# Patient Record
Sex: FEMALE | Race: WHITE | URBAN - METROPOLITAN AREA
[De-identification: names, ages, dates, MRNs, and addresses within clinical notes are randomized per-mention and may not be internally consistent; named-entity substitution may affect disease eponyms.]

---

## 2017-06-19 ENCOUNTER — OUTPATIENT (OUTPATIENT)
Dept: OUTPATIENT SERVICES | Facility: HOSPITAL | Age: 67
LOS: 1 days | Discharge: HOME | End: 2017-06-19

## 2017-06-28 DIAGNOSIS — E11.9 TYPE 2 DIABETES MELLITUS WITHOUT COMPLICATIONS: ICD-10-CM

## 2017-06-28 DIAGNOSIS — I10 ESSENTIAL (PRIMARY) HYPERTENSION: ICD-10-CM

## 2017-06-28 DIAGNOSIS — E78.00 PURE HYPERCHOLESTEROLEMIA, UNSPECIFIED: ICD-10-CM

## 2017-10-23 ENCOUNTER — OUTPATIENT (OUTPATIENT)
Dept: OUTPATIENT SERVICES | Facility: HOSPITAL | Age: 67
LOS: 1 days | Discharge: HOME | End: 2017-10-23

## 2017-10-23 DIAGNOSIS — E11.9 TYPE 2 DIABETES MELLITUS WITHOUT COMPLICATIONS: ICD-10-CM

## 2017-10-23 DIAGNOSIS — E78.00 PURE HYPERCHOLESTEROLEMIA, UNSPECIFIED: ICD-10-CM

## 2017-10-23 DIAGNOSIS — Z00.00 ENCOUNTER FOR GENERAL ADULT MEDICAL EXAMINATION WITHOUT ABNORMAL FINDINGS: ICD-10-CM

## 2017-10-23 DIAGNOSIS — I10 ESSENTIAL (PRIMARY) HYPERTENSION: ICD-10-CM

## 2018-03-27 ENCOUNTER — OUTPATIENT (OUTPATIENT)
Dept: OUTPATIENT SERVICES | Facility: HOSPITAL | Age: 68
LOS: 1 days | Discharge: HOME | End: 2018-03-27

## 2018-03-27 DIAGNOSIS — E11.9 TYPE 2 DIABETES MELLITUS WITHOUT COMPLICATIONS: ICD-10-CM

## 2018-03-27 DIAGNOSIS — E78.00 PURE HYPERCHOLESTEROLEMIA, UNSPECIFIED: ICD-10-CM

## 2018-03-27 DIAGNOSIS — I10 ESSENTIAL (PRIMARY) HYPERTENSION: ICD-10-CM

## 2018-03-27 DIAGNOSIS — Z00.00 ENCOUNTER FOR GENERAL ADULT MEDICAL EXAMINATION WITHOUT ABNORMAL FINDINGS: ICD-10-CM

## 2018-08-08 ENCOUNTER — OUTPATIENT (OUTPATIENT)
Dept: OUTPATIENT SERVICES | Facility: HOSPITAL | Age: 68
LOS: 1 days | Discharge: HOME | End: 2018-08-08

## 2018-08-08 DIAGNOSIS — Z00.00 ENCOUNTER FOR GENERAL ADULT MEDICAL EXAMINATION WITHOUT ABNORMAL FINDINGS: ICD-10-CM

## 2018-08-08 DIAGNOSIS — D51.0 VITAMIN B12 DEFICIENCY ANEMIA DUE TO INTRINSIC FACTOR DEFICIENCY: ICD-10-CM

## 2018-08-08 DIAGNOSIS — E78.00 PURE HYPERCHOLESTEROLEMIA, UNSPECIFIED: ICD-10-CM

## 2018-08-08 DIAGNOSIS — E11.9 TYPE 2 DIABETES MELLITUS WITHOUT COMPLICATIONS: ICD-10-CM

## 2018-08-10 ENCOUNTER — OUTPATIENT (OUTPATIENT)
Dept: OUTPATIENT SERVICES | Facility: HOSPITAL | Age: 68
LOS: 1 days | Discharge: HOME | End: 2018-08-10

## 2018-08-10 DIAGNOSIS — Z12.31 ENCOUNTER FOR SCREENING MAMMOGRAM FOR MALIGNANT NEOPLASM OF BREAST: ICD-10-CM

## 2018-08-21 ENCOUNTER — OUTPATIENT (OUTPATIENT)
Dept: OUTPATIENT SERVICES | Facility: HOSPITAL | Age: 68
LOS: 1 days | Discharge: HOME | End: 2018-08-21

## 2018-08-21 DIAGNOSIS — R92.8 OTHER ABNORMAL AND INCONCLUSIVE FINDINGS ON DIAGNOSTIC IMAGING OF BREAST: ICD-10-CM

## 2018-11-25 ENCOUNTER — TRANSCRIPTION ENCOUNTER (OUTPATIENT)
Age: 68
End: 2018-11-25

## 2018-12-08 ENCOUNTER — OUTPATIENT (OUTPATIENT)
Dept: OUTPATIENT SERVICES | Facility: HOSPITAL | Age: 68
LOS: 1 days | Discharge: HOME | End: 2018-12-08

## 2018-12-08 DIAGNOSIS — E78.00 PURE HYPERCHOLESTEROLEMIA, UNSPECIFIED: ICD-10-CM

## 2018-12-08 DIAGNOSIS — D51.0 VITAMIN B12 DEFICIENCY ANEMIA DUE TO INTRINSIC FACTOR DEFICIENCY: ICD-10-CM

## 2018-12-08 DIAGNOSIS — E11.9 TYPE 2 DIABETES MELLITUS WITHOUT COMPLICATIONS: ICD-10-CM

## 2018-12-08 DIAGNOSIS — Z00.00 ENCOUNTER FOR GENERAL ADULT MEDICAL EXAMINATION WITHOUT ABNORMAL FINDINGS: ICD-10-CM

## 2019-03-01 ENCOUNTER — EMERGENCY (EMERGENCY)
Facility: HOSPITAL | Age: 69
LOS: 0 days | Discharge: HOME | End: 2019-03-02
Attending: EMERGENCY MEDICINE | Admitting: EMERGENCY MEDICINE
Payer: MEDICARE

## 2019-03-01 VITALS
HEART RATE: 64 BPM | SYSTOLIC BLOOD PRESSURE: 187 MMHG | DIASTOLIC BLOOD PRESSURE: 84 MMHG | RESPIRATION RATE: 18 BRPM | TEMPERATURE: 98 F | OXYGEN SATURATION: 99 %

## 2019-03-01 DIAGNOSIS — E11.9 TYPE 2 DIABETES MELLITUS WITHOUT COMPLICATIONS: ICD-10-CM

## 2019-03-01 DIAGNOSIS — E11.649 TYPE 2 DIABETES MELLITUS WITH HYPOGLYCEMIA WITHOUT COMA: ICD-10-CM

## 2019-03-01 DIAGNOSIS — E78.5 HYPERLIPIDEMIA, UNSPECIFIED: ICD-10-CM

## 2019-03-01 DIAGNOSIS — I10 ESSENTIAL (PRIMARY) HYPERTENSION: ICD-10-CM

## 2019-03-01 LAB
ALBUMIN SERPL ELPH-MCNC: 3.9 G/DL — SIGNIFICANT CHANGE UP (ref 3.5–5.2)
ALP SERPL-CCNC: 87 U/L — SIGNIFICANT CHANGE UP (ref 30–115)
ALT FLD-CCNC: 18 U/L — SIGNIFICANT CHANGE UP (ref 0–41)
ANION GAP SERPL CALC-SCNC: 12 MMOL/L — SIGNIFICANT CHANGE UP (ref 7–14)
APPEARANCE UR: CLEAR — SIGNIFICANT CHANGE UP
AST SERPL-CCNC: 18 U/L — SIGNIFICANT CHANGE UP (ref 0–41)
BASOPHILS # BLD AUTO: 0.03 K/UL — SIGNIFICANT CHANGE UP (ref 0–0.2)
BASOPHILS NFR BLD AUTO: 0.5 % — SIGNIFICANT CHANGE UP (ref 0–1)
BILIRUB SERPL-MCNC: 0.4 MG/DL — SIGNIFICANT CHANGE UP (ref 0.2–1.2)
BILIRUB UR-MCNC: NEGATIVE — SIGNIFICANT CHANGE UP
BUN SERPL-MCNC: 9 MG/DL — LOW (ref 10–20)
CALCIUM SERPL-MCNC: 8.8 MG/DL — SIGNIFICANT CHANGE UP (ref 8.5–10.1)
CHLORIDE SERPL-SCNC: 106 MMOL/L — SIGNIFICANT CHANGE UP (ref 98–110)
CO2 SERPL-SCNC: 23 MMOL/L — SIGNIFICANT CHANGE UP (ref 17–32)
COLOR SPEC: YELLOW — SIGNIFICANT CHANGE UP
CREAT SERPL-MCNC: 0.6 MG/DL — LOW (ref 0.7–1.5)
DIFF PNL FLD: NEGATIVE — SIGNIFICANT CHANGE UP
EOSINOPHIL # BLD AUTO: 0.02 K/UL — SIGNIFICANT CHANGE UP (ref 0–0.7)
EOSINOPHIL NFR BLD AUTO: 0.3 % — SIGNIFICANT CHANGE UP (ref 0–8)
GLUCOSE SERPL-MCNC: 201 MG/DL — HIGH (ref 70–99)
GLUCOSE UR QL: 100 MG/DL
HCT VFR BLD CALC: 35.1 % — LOW (ref 37–47)
HGB BLD-MCNC: 11.6 G/DL — LOW (ref 12–16)
IMM GRANULOCYTES NFR BLD AUTO: 0.3 % — SIGNIFICANT CHANGE UP (ref 0.1–0.3)
KETONES UR-MCNC: NEGATIVE — SIGNIFICANT CHANGE UP
LACTATE SERPL-SCNC: 1.4 MMOL/L — SIGNIFICANT CHANGE UP (ref 0.5–2.2)
LEUKOCYTE ESTERASE UR-ACNC: NEGATIVE — SIGNIFICANT CHANGE UP
LYMPHOCYTES # BLD AUTO: 0.58 K/UL — LOW (ref 1.2–3.4)
LYMPHOCYTES # BLD AUTO: 8.8 % — LOW (ref 20.5–51.1)
MCHC RBC-ENTMCNC: 28.2 PG — SIGNIFICANT CHANGE UP (ref 27–31)
MCHC RBC-ENTMCNC: 33 G/DL — SIGNIFICANT CHANGE UP (ref 32–37)
MCV RBC AUTO: 85.2 FL — SIGNIFICANT CHANGE UP (ref 81–99)
MONOCYTES # BLD AUTO: 0.41 K/UL — SIGNIFICANT CHANGE UP (ref 0.1–0.6)
MONOCYTES NFR BLD AUTO: 6.3 % — SIGNIFICANT CHANGE UP (ref 1.7–9.3)
NEUTROPHILS # BLD AUTO: 5.5 K/UL — SIGNIFICANT CHANGE UP (ref 1.4–6.5)
NEUTROPHILS NFR BLD AUTO: 83.8 % — HIGH (ref 42.2–75.2)
NITRITE UR-MCNC: NEGATIVE — SIGNIFICANT CHANGE UP
NRBC # BLD: 0 /100 WBCS — SIGNIFICANT CHANGE UP (ref 0–0)
PH UR: 6 — SIGNIFICANT CHANGE UP (ref 5–8)
PLATELET # BLD AUTO: 290 K/UL — SIGNIFICANT CHANGE UP (ref 130–400)
POTASSIUM SERPL-MCNC: 4.3 MMOL/L — SIGNIFICANT CHANGE UP (ref 3.5–5)
POTASSIUM SERPL-SCNC: 4.3 MMOL/L — SIGNIFICANT CHANGE UP (ref 3.5–5)
PROT SERPL-MCNC: 6.4 G/DL — SIGNIFICANT CHANGE UP (ref 6–8)
PROT UR-MCNC: NEGATIVE MG/DL — SIGNIFICANT CHANGE UP
RBC # BLD: 4.12 M/UL — LOW (ref 4.2–5.4)
RBC # FLD: 13.8 % — SIGNIFICANT CHANGE UP (ref 11.5–14.5)
SODIUM SERPL-SCNC: 141 MMOL/L — SIGNIFICANT CHANGE UP (ref 135–146)
SP GR SPEC: 1.01 — SIGNIFICANT CHANGE UP (ref 1.01–1.03)
UROBILINOGEN FLD QL: 1 MG/DL (ref 0.2–0.2)
WBC # BLD: 6.56 K/UL — SIGNIFICANT CHANGE UP (ref 4.8–10.8)
WBC # FLD AUTO: 6.56 K/UL — SIGNIFICANT CHANGE UP (ref 4.8–10.8)

## 2019-03-01 RX ORDER — METFORMIN HYDROCHLORIDE 850 MG/1
1000 TABLET ORAL
Qty: 0 | Refills: 0 | Status: DISCONTINUED | OUTPATIENT
Start: 2019-03-01 | End: 2019-03-02

## 2019-03-01 RX ADMIN — METFORMIN HYDROCHLORIDE 1000 MILLIGRAM(S): 850 TABLET ORAL at 20:22

## 2019-03-01 NOTE — ED PROVIDER NOTE - CLINICAL SUMMARY MEDICAL DECISION MAKING FREE TEXT BOX
68 female here with hypoglycemia on sulfonylurea Rx, no recent change, no SI or intent to harm, with otherwise normal ROS and exam. Case d/w Tox consult with plan for extended observation and hourly [facility] checking. Will dispo to hugo SNYDER d/w Dr. Castillo

## 2019-03-01 NOTE — ED CDU PROVIDER INITIAL DAY NOTE - OBJECTIVE STATEMENT
69 y/o female with PMHX of DM, HTN, HLD presenting under Toxicology Protocol. Pt presenting to the ED with hypoglycemia. As per pt, this morning was found by her  unresponsive, EMS was contact. Pt found with a fingerstick of 35, pt was given an amp of dextrose with response. Pt currently on Metformin 1000mg BID and Glimepiride (pt unsure of dosage, but takes once a day). Pt has had no change in her medication routine, last dose of Glimepiride was yesterday. Denies any current symptoms. FS in ED triage 149.

## 2019-03-01 NOTE — ED ADULT TRIAGE NOTE - CHIEF COMPLAINT QUOTE
pt hypoglycemic with EMS, EMS gave 1 amp of Dextrose IV and blood glucose increased. pt is alert and oriented x 3, in no distress. pt hypoglycemic with EMS, EMS gave 1 amp of Dextrose IV and blood glucose increased. pt is alert and oriented x 3, in no distress. HE=255 in triage, pt hypoglycemic (FS=35) with EMS, EMS gave 1 amp of Dextrose IV and blood glucose increased. pt is alert and oriented x 3, in no distress. RW=456 in triage,

## 2019-03-01 NOTE — ED CDU PROVIDER INITIAL DAY NOTE - ATTENDING CONTRIBUTION TO CARE
Pt presented with hypoglycemia that occurred in the am. Hx of diabetes and on same meds for a few years. Rarely has hypoglycemia. Pt thinks she ate less the night before. No other complaints. Exam is normal.

## 2019-03-01 NOTE — ED PROVIDER NOTE - PROGRESS NOTE DETAILS
Spoke to toxicology fellow, advised to observe patient for 8-12 hours, perform q1hour fingersticks initially then q2hrs fingerstick after normalization, and give patient complex carbohydrates.

## 2019-03-01 NOTE — ED PROVIDER NOTE - PHYSICAL EXAMINATION
CONSTITUTIONAL: Well-developed; well-nourished; elderly female, well appearing, in no acute distress.   SKIN: warm, dry  HEAD: Normocephalic; atraumatic.  EYES: no conj injection  ENT: No nasal discharge; airway clear.  NECK: Supple; non tender.  CARD: S1, S2 normal; no murmurs, gallops, or rubs. Regular rate and rhythm.   RESP: No wheezes, rales or rhonchi.  ABD: soft ntnd  EXT: Normal ROM.  No clubbing, cyanosis or edema.   NEURO: Alert, oriented, grossly unremarkable  PSYCH: Cooperative, appropriate.

## 2019-03-01 NOTE — CONSULT NOTE ADULT - ASSESSMENT
1- Sulfonylurea induced hypoglycemia    Patient with likely routine hypoglycemia from sulfonylurea.  No precipitating event    Normal renal and liver function.    No CYP inhibiting medications.    No medication interactions.    Plan- acute hypoglycemia-      Given 0.5-1 gram/kg IV dextrose to correct capillary hypoglycemia; feed the patient to replace glycogen stores.    If repeat episode, dop above and give octreotide 50 mcg SQ q 6 hours x 4 doses.    Discontinue glimepiride indefinitely.  Change metformin to 100mg PO BID going forward.    Patient has an appointment with dr. Lambert on Monday.      Observe until tomorrow morning to ensure capillary glucoses remain stable.

## 2019-03-01 NOTE — ED ADULT NURSE NOTE - NSIMPLEMENTINTERV_GEN_ALL_ED
Implemented All Universal Safety Interventions:  [place] to call system. Call bell, personal items and telephone within reach. Instruct patient to call for assistance. Room bathroom lighting operational. Non-slip footwear when patient is off stretcher. Physically safe environment: no spills, clutter or unnecessary equipment. Stretcher in lowest position, wheels locked, appropriate side rails in place.

## 2019-03-01 NOTE — ED PROVIDER NOTE - OBJECTIVE STATEMENT
69 y/o female with pmhx of DM, HTN, DLD presents with hypoglycemia. Patient states she woke up this morning per her routine and then the next thing she remembers is her  calling her name and then EMS arriving to her home. Per , patient was found down in the bedroom unresponsive. Fingerstick on arrival was 35, patient was given 1 amp of dextrose and she ate a muffin afterwards. Patient states she has been taking her medications as usual, no dose change. Patient admits to taking metformin and glimiperide for DM, last dose of glimiperide was yesterday afternoon, per her normal routine. Patient admits to recent productive cough however denies fevers/chills, sob, chest pain, n/v/d, abdominal pain.

## 2019-03-01 NOTE — CONSULT NOTE ADULT - SUBJECTIVE AND OBJECTIVE BOX
Time:19 @ 14:32  [facility] ED  Emergent/Routine    Chief Complaint:  Hypoglycemia    History of Present Illness:  68y woman presents to ED after being found poorly responsive and confused by her  around 8 AM.  Patient states that she woke up normal around 4 AM to feed her dogs.  She took her glimepiride around 4 PM the evening before.  She is also taking metformin 1000 mg orally once per day and also lisinopril atorvastatin, baby ASA and atenolol.  Patient had capillary glucose of 35 and improved after 25 grams of dextrose with complete resolution.  Has eaten a muffin and lunch since this occurred and is is now feeling well.  Has had a mild URI in the past week for which she has been taking guaifenesin.  No fever, chills, abdominal pain, vomiting, diarrhea or rash.  Currently asymptomatic.  No recent changes in dosing.  Denies alcohol or other drug use.        Past Medical History:  ^HYPOGLYCEMIA  MEWS Score  High cholesterol  HTN (hypertension)  DM (diabetes mellitus)  Hypoglycemia  HYPOGLYCEMIA        Home Medications:  See above glimepiride 4mg once per day; metformin 1000 mg orally once per day ;lisinopril atorvastatin, baby ASA and atenolol      Active Medications:  MEDICATIONS  (STANDING):    MEDICATIONS  (PRN):        Social History:  Tobacco:   Denied  EtOH:   Denied  Illicit Substances:   Denied    Family History:  N/A    Review of Systems:  GENERAL: Denies fever/chills, loss of appetite/weight or fatigue  SKIN: [place] rashes, abrasions, lacerations, ecchymosis, erythema, or edema.  HEAD: Denies headache, dizziness or trauma  EYES: Denies blurry vision, diplopia, or photophobia  ENT: Denies earaches, discharge or hearing loss. Denies epistaxis. (+) clear nasal discharge; No sore throat.   CARDIAC: Denies chest pain, palpitations, or SOB.   RESPIRATORY: Denies SOB, cough, hemoptysis or wheezing.   GI: Denies abdominal pain, n/v/d, bloody stools or melena.   : Denies hematuria, dysuria or frequency.   MSK: Denies myalgias, bony deformity or pain.   NEURO: Denies numbness, tingling, weakness.    Physical Exam:  Vital Signs Last 24 Hrs  T(C): 36.4 (01 Mar 2019 08:55), Max: 36.4 (01 Mar 2019 08:55)  T(F): 97.5 (01 Mar 2019 08:55), Max: 97.5 (01 Mar 2019 08:55)  HR: 64 (01 Mar 2019 13:11) (64 - 64)  BP: 180/84 (01 Mar 2019 13:11) (180/84 - 187/84)  BP(mean): --  RR: 18 (01 Mar 2019 13:11) (18 - 18)  SpO2: 99% (01 Mar 2019 13:11) (99% - 99%)  CAPILLARY BLOOD GLUCOSE      POCT Blood Glucose.: 237 mg/dL (01 Mar 2019 13:49)  POCT Blood Glucose.: 238 mg/dL (01 Mar 2019 11:43)  POCT Blood Glucose.: 205 mg/dL (01 Mar 2019 10:40)      VITAL SIGNS: I have reviewed nursing notes and confirm.  CONSTITUTIONAL: Well-developed; well-nourished; in no acute distress.  SKIN: Skin exam is warm and dry, no acute rash.  HEAD: Normocephalic; atraumatic.  EYES: PERRL, EOM intact; conjunctiva and sclera clear.  ENT: No nasal discharge; airway clear. TMs clear.  NECK: Supple; non tender.  CARD: S1, S2 normal; no murmurs, gallops, or rubs. Regular rate and rhythm.  RESP: No wheezes, rales or rhonchi.  ABD: Normal bowel sounds; soft; non-distended; non-tender; no hepatosplenomegaly.  EXT: Normal ROM. No clubbing, cyanosis or edema.  LYMPH: No acute cervical adenopathy.  NEURO: Alert, oriented. Grossly unremarkable. No focal deficits.  PSYCH: Cooperative, appropriate.      GCS:  E:   4/4  V:   5/5  M:   6/6    Labs:                        11.6   6.56  )-----------( 290      ( 01 Mar 2019 10:50 )             35.1     03-    141  |  106  |  9<L>  ----------------------------<  201<H>  4.3   |  23  |  0.6<L>    Ca    8.8      01 Mar 2019 10:50    TPro  6.4  /  Alb  3.9  /  TBili  0.4  /  DBili  x   /  AST  18  /  ALT  18  /  AlkPhos  87  03-      Urinalysis Basic - ( 01 Mar 2019 12:40 )    Color: Yellow / Appearance: Clear / S.010 / pH: x  Gluc: x / Ketone: Negative  / Bili: Negative / Urobili: 1.0 mg/dL   Blood: x / Protein: Negative mg/dL / Nitrite: Negative   Leuk Esterase: Negative / RBC: x / WBC x   Sq Epi: x / Non Sq Epi: x / Bacteria: x        Aspirin:  not detected  Acetaminophen:  not detected  Ethanol:  not detected

## 2019-03-01 NOTE — ED ADULT NURSE REASSESSMENT NOTE - CONDITION
Patient is alert and oriented. Ambulatory in no acute distress. Fingerstick done and medication administered as ordered. Patient denies any chest pain or lightheadedness. Will continue to monitor blood sugars. Po diet tolerated.

## 2019-03-01 NOTE — ED ADULT NURSE NOTE - CHIEF COMPLAINT QUOTE
pt hypoglycemic (FS=35) with EMS, EMS gave 1 amp of Dextrose IV and blood glucose increased. pt is alert and oriented x 3, in no distress. GP=119 in triage,

## 2019-03-01 NOTE — ED PROVIDER NOTE - ATTENDING CONTRIBUTION TO CARE
I personally evaluated the patient. I reviewed the Resident’s or Physician Assistant’s note (as assigned above), and agree with the findings and plan except as documented in my note.     68 female here with unintentional hypoglycemia, has no recent change in Rx or diet. Symptomatic but no syncope or other symptoms.     ROS otherwise unremarkable    PE: female in no distress. CV: pulses intact. SKIN normal HEENT: normal mucosa. NEURO: AAO 3 no focal deficits. ABD; soft, non rigid    Impression: hypoglycemia    Plan: IV labs imaging supportive care and d/w Toxicology

## 2019-03-01 NOTE — ED PROVIDER NOTE - NS ED ROS FT
Constitutional: See HPI.  Eyes: No visual changes, eye pain or discharge.  ENMT: No hearing changes, pain, discharge or infections. No neck pain or stiffness.  Cardiac: No chest pain, SOB or edema. No chest pain with exertion.  Respiratory: No cough or respiratory distress.   GI: No nausea, vomiting, diarrhea or abdominal pain.  : No dysuria, frequency or burning.  MS: No myalgia, muscle weakness, joint pain or back pain.  Neuro: No headache or weakness. No LOC.  Skin: No skin rash.  Endo: + hx of DM, no thyroid disease  Except as documented in HPI, all other review of systems is negative

## 2019-03-01 NOTE — CONSULT NOTE ADULT - ATTENDING COMMENTS
--Will continue to follow. Please call with any further questions    Sangeeta    [phone] (pager)

## 2019-03-02 VITALS
SYSTOLIC BLOOD PRESSURE: 132 MMHG | OXYGEN SATURATION: 99 % | TEMPERATURE: 97 F | DIASTOLIC BLOOD PRESSURE: 86 MMHG | HEART RATE: 65 BPM | RESPIRATION RATE: 18 BRPM

## 2019-03-02 PROCEDURE — 93010 ELECTROCARDIOGRAM REPORT: CPT

## 2019-03-02 RX ADMIN — METFORMIN HYDROCHLORIDE 1000 MILLIGRAM(S): 850 TABLET ORAL at 06:52

## 2019-03-02 NOTE — ED CDU PROVIDER DISPOSITION NOTE - CLINICAL COURSE
Placed into observation for hypoglycemia. Pt while her had her medication held and was doing well. Pt educated on eating regularly. Need for close follow up with pmd. Advised to reduce dose of her diabetic meds to half.

## 2019-03-02 NOTE — ED CDU PROVIDER DISPOSITION NOTE - CARE PROVIDERS DIRECT ADDRESSES
[email or web address] [email or web address]

## 2019-03-02 NOTE — ED CDU PROVIDER DISPOSITION NOTE - NSFOLLOWUPINSTRUCTIONS_ED_ALL_ED_FT
Please follow up with Dr. Lambert. In the mean time you can reduce the dose of your diabetic pill to half.

## 2019-03-02 NOTE — ED CDU PROVIDER SUBSEQUENT DAY NOTE - PROGRESS NOTE DETAILS
pt seen bedside, NAD, can anticipate d/c in am if pt feeling better. patient almas dout from [facility]ilene bhatti, no complaint feeling better, ate breakfast will continue to monitor possible d/c home with daughter

## 2019-03-02 NOTE — ED ADULT NURSE REASSESSMENT NOTE - CONDITION
Patient is awake secondary to blood sugar testing. States she feels good because she got a few hours of sleep. Blood sugar within range. Will continue to monitor.

## 2019-03-02 NOTE — ED CDU PROVIDER SUBSEQUENT DAY NOTE - HISTORY
Pt found with a fingerstick of 35, pt was given an amp of dextrose with response. Pt currently on Metformin 1000mg BID and Glimepiride (pt unsure of dosage, but takes once a day). Pt has had no change in her medication routine, last dose of Glimepiride was yesterday. /182 most recently.

## 2019-03-02 NOTE — ED CDU PROVIDER DISPOSITION NOTE - CARE PROVIDER_API CALL
Azael Lambert)  Internal Medicine  [address] [place] [address]  Phone: [phone]  Fax: [phone]  Follow Up Time:

## 2019-03-02 NOTE — ED CDU PROVIDER SUBSEQUENT DAY NOTE - MEDICAL DECISION MAKING DETAILS
Pt is doing well. Will need to follow up with Dr. Rodriguez for medication adjustment. In the mean time pt can break pill in half to lower dose. Toxicology called to clear pt.

## 2019-03-02 NOTE — ED CDU PROVIDER SUBSEQUENT DAY NOTE - CONDUCTED A DETAILED DISCUSSION WITH PATIENT AND/OR GUARDIAN REGARDING, MDM
lab results/need for outpatient follow-up/return to ED if symptoms worsen, persist or questions arise/Dr. Rodriguez for diabetic management

## 2019-03-03 LAB
CULTURE RESULTS: SIGNIFICANT CHANGE UP
SPECIMEN SOURCE: SIGNIFICANT CHANGE UP

## 2019-04-08 ENCOUNTER — OUTPATIENT (OUTPATIENT)
Dept: OUTPATIENT SERVICES | Facility: HOSPITAL | Age: 69
LOS: 1 days | Discharge: HOME | End: 2019-04-08

## 2019-04-08 DIAGNOSIS — E11.9 TYPE 2 DIABETES MELLITUS WITHOUT COMPLICATIONS: ICD-10-CM

## 2019-04-08 DIAGNOSIS — E78.00 PURE HYPERCHOLESTEROLEMIA, UNSPECIFIED: ICD-10-CM

## 2019-04-08 DIAGNOSIS — Z00.00 ENCOUNTER FOR GENERAL ADULT MEDICAL EXAMINATION WITHOUT ABNORMAL FINDINGS: ICD-10-CM

## 2019-04-08 DIAGNOSIS — I10 ESSENTIAL (PRIMARY) HYPERTENSION: ICD-10-CM

## 2019-06-18 ENCOUNTER — OUTPATIENT (OUTPATIENT)
Dept: OUTPATIENT SERVICES | Facility: HOSPITAL | Age: 69
LOS: 1 days | Discharge: HOME | End: 2019-06-18

## 2019-06-18 DIAGNOSIS — E11.9 TYPE 2 DIABETES MELLITUS WITHOUT COMPLICATIONS: ICD-10-CM

## 2019-06-18 DIAGNOSIS — I10 ESSENTIAL (PRIMARY) HYPERTENSION: ICD-10-CM

## 2019-07-15 ENCOUNTER — OUTPATIENT (OUTPATIENT)
Dept: OUTPATIENT SERVICES | Facility: HOSPITAL | Age: 69
LOS: 1 days | Discharge: HOME | End: 2019-07-15
Payer: MEDICARE

## 2019-07-15 DIAGNOSIS — M79.643 PAIN IN UNSPECIFIED HAND: ICD-10-CM

## 2019-07-15 DIAGNOSIS — M25.339 OTHER INSTABILITY, UNSPECIFIED WRIST: ICD-10-CM

## 2019-07-15 PROCEDURE — 73130 X-RAY EXAM OF HAND: CPT | Mod: 26,50

## 2019-07-15 PROCEDURE — 73110 X-RAY EXAM OF WRIST: CPT | Mod: 26,50

## 2019-09-03 ENCOUNTER — OUTPATIENT (OUTPATIENT)
Dept: OUTPATIENT SERVICES | Facility: HOSPITAL | Age: 69
LOS: 1 days | Discharge: HOME | End: 2019-09-03

## 2019-09-03 DIAGNOSIS — E78.00 PURE HYPERCHOLESTEROLEMIA, UNSPECIFIED: ICD-10-CM

## 2019-09-03 DIAGNOSIS — Z00.00 ENCOUNTER FOR GENERAL ADULT MEDICAL EXAMINATION WITHOUT ABNORMAL FINDINGS: ICD-10-CM

## 2019-09-03 DIAGNOSIS — E11.9 TYPE 2 DIABETES MELLITUS WITHOUT COMPLICATIONS: ICD-10-CM

## 2019-09-03 DIAGNOSIS — D51.0 VITAMIN B12 DEFICIENCY ANEMIA DUE TO INTRINSIC FACTOR DEFICIENCY: ICD-10-CM

## 2019-09-13 ENCOUNTER — OUTPATIENT (OUTPATIENT)
Dept: OUTPATIENT SERVICES | Facility: HOSPITAL | Age: 69
LOS: 1 days | Discharge: HOME | End: 2019-09-13
Payer: MEDICARE

## 2019-09-13 DIAGNOSIS — Z12.31 ENCOUNTER FOR SCREENING MAMMOGRAM FOR MALIGNANT NEOPLASM OF BREAST: ICD-10-CM

## 2019-09-13 PROCEDURE — 77063 BREAST TOMOSYNTHESIS BI: CPT | Mod: 26

## 2019-09-13 PROCEDURE — 77067 SCR MAMMO BI INCL CAD: CPT | Mod: 26

## 2020-11-09 ENCOUNTER — OUTPATIENT (OUTPATIENT)
Dept: OUTPATIENT SERVICES | Facility: HOSPITAL | Age: 70
LOS: 1 days | Discharge: HOME | End: 2020-11-09
Payer: MEDICARE

## 2020-11-09 DIAGNOSIS — Z12.31 ENCOUNTER FOR SCREENING MAMMOGRAM FOR MALIGNANT NEOPLASM OF BREAST: ICD-10-CM

## 2020-11-09 PROCEDURE — 77063 BREAST TOMOSYNTHESIS BI: CPT | Mod: 26

## 2020-11-09 PROCEDURE — 77067 SCR MAMMO BI INCL CAD: CPT | Mod: 26

## 2021-10-22 ENCOUNTER — APPOINTMENT (OUTPATIENT)
Dept: OPHTHALMOLOGY | Facility: CLINIC | Age: 71
End: 2021-10-22

## 2022-03-04 ENCOUNTER — OUTPATIENT (OUTPATIENT)
Dept: OUTPATIENT SERVICES | Facility: HOSPITAL | Age: 72
LOS: 1 days | Discharge: HOME | End: 2022-03-04
Payer: MEDICARE

## 2022-03-04 DIAGNOSIS — Z12.31 ENCOUNTER FOR SCREENING MAMMOGRAM FOR MALIGNANT NEOPLASM OF BREAST: ICD-10-CM

## 2022-03-04 PROCEDURE — 77067 SCR MAMMO BI INCL CAD: CPT | Mod: 26

## 2022-03-04 PROCEDURE — 77063 BREAST TOMOSYNTHESIS BI: CPT | Mod: 26

## 2023-03-30 ENCOUNTER — OUTPATIENT (OUTPATIENT)
Dept: OUTPATIENT SERVICES | Facility: HOSPITAL | Age: 73
LOS: 1 days | End: 2023-03-30
Payer: MEDICARE

## 2023-03-30 DIAGNOSIS — Z12.31 ENCOUNTER FOR SCREENING MAMMOGRAM FOR MALIGNANT NEOPLASM OF BREAST: ICD-10-CM

## 2023-03-30 PROCEDURE — 77067 SCR MAMMO BI INCL CAD: CPT

## 2023-03-30 PROCEDURE — 77067 SCR MAMMO BI INCL CAD: CPT | Mod: 26

## 2023-03-30 PROCEDURE — 77063 BREAST TOMOSYNTHESIS BI: CPT | Mod: 26

## 2023-03-30 PROCEDURE — 77063 BREAST TOMOSYNTHESIS BI: CPT

## 2023-03-31 DIAGNOSIS — Z12.31 ENCOUNTER FOR SCREENING MAMMOGRAM FOR MALIGNANT NEOPLASM OF BREAST: ICD-10-CM

## 2023-08-14 PROBLEM — Z00.00 ENCOUNTER FOR PREVENTIVE HEALTH EXAMINATION: Status: ACTIVE | Noted: 2023-08-14

## 2023-11-22 NOTE — ED CDU PROVIDER INITIAL DAY NOTE - CROS ED ENDOCRINE ALL NEG
-Bladder ultrasound this past year did not show significant postvoid residual  -We discussed the role of caffeinated beverages and causing a lot of her urinary symptoms  -I have recommended she give up the caffeinated beverages and soda for the next 4 weeks to see if it has a positive impact on her urinary symptoms  -She will provide a clean-catch specimen today for checking blood and also checking for infection.  -I have agreed to contact her with the results  -We discussed possibly a trial of medication and we also talked about urogynecology   - - -

## 2024-03-05 ENCOUNTER — APPOINTMENT (OUTPATIENT)
Dept: OBGYN | Facility: CLINIC | Age: 74
End: 2024-03-05
Payer: MEDICARE

## 2024-03-05 VITALS
WEIGHT: 126 LBS | DIASTOLIC BLOOD PRESSURE: 78 MMHG | HEIGHT: 62 IN | HEART RATE: 79 BPM | SYSTOLIC BLOOD PRESSURE: 130 MMHG | BODY MASS INDEX: 23.19 KG/M2

## 2024-03-05 DIAGNOSIS — Z78.9 OTHER SPECIFIED HEALTH STATUS: ICD-10-CM

## 2024-03-05 DIAGNOSIS — Z86.79 PERSONAL HISTORY OF OTHER DISEASES OF THE CIRCULATORY SYSTEM: ICD-10-CM

## 2024-03-05 DIAGNOSIS — Z86.39 PERSONAL HISTORY OF OTHER ENDOCRINE, NUTRITIONAL AND METABOLIC DISEASE: ICD-10-CM

## 2024-03-05 DIAGNOSIS — Z01.419 ENCOUNTER FOR GYNECOLOGICAL EXAMINATION (GENERAL) (ROUTINE) W/OUT ABNORMAL FINDINGS: ICD-10-CM

## 2024-03-05 DIAGNOSIS — Z80.0 FAMILY HISTORY OF MALIGNANT NEOPLASM OF DIGESTIVE ORGANS: ICD-10-CM

## 2024-03-05 PROCEDURE — G0101: CPT

## 2024-03-05 RX ORDER — SITAGLIPTIN AND METFORMIN HYDROCHLORIDE 50; 1000 MG/1; MG/1
TABLET, FILM COATED ORAL
Refills: 0 | Status: ACTIVE | COMMUNITY

## 2024-03-05 RX ORDER — ATENOLOL 100 MG/1
100 TABLET ORAL
Refills: 0 | Status: ACTIVE | COMMUNITY

## 2024-03-05 RX ORDER — LISINOPRIL 40 MG/1
40 TABLET ORAL
Refills: 0 | Status: ACTIVE | COMMUNITY

## 2024-03-05 RX ORDER — ATORVASTATIN CALCIUM 80 MG/1
80 TABLET, FILM COATED ORAL
Refills: 0 | Status: ACTIVE | COMMUNITY

## 2024-03-05 NOTE — PHYSICAL EXAM
[Appropriately responsive] : appropriately responsive [Alert] : alert [No Acute Distress] : no acute distress [No Lymphadenopathy] : no lymphadenopathy [Soft] : soft [Non-tender] : non-tender [Non-distended] : non-distended [No HSM] : No HSM [No Mass] : no mass [No Lesions] : no lesions [Oriented x3] : oriented x3 [Examination Of The Breasts] : a normal appearance [No Discharge] : no discharge [No Masses] : no breast masses were palpable [Labia Minora] : normal [Labia Majora] : normal [Normal] : normal [Uterine Adnexae] : normal

## 2024-03-05 NOTE — HISTORY OF PRESENT ILLNESS
[Patient reported mammogram was normal] : Patient reported mammogram was normal [Patient reported colonoscopy was normal] : Patient reported colonoscopy was normal [N] : Patient denies prior pregnancies [Patient reported PAP Smear was normal] : Patient reported PAP Smear was normal [TextBox_4] : GYNHX No history of fibroids, cysts, or STDs last  gyn exam 2017 last pap 2017? [PapSmeardate] : 2017 [Mammogramdate] : 2023 [BoneDensityDate] : 2015 [ColonoscopyDate] : 2020 [PGHxTotal] : 0 [FreeTextEntry1] : 1- adopted at 2 months old daughter

## 2024-04-25 ENCOUNTER — OUTPATIENT (OUTPATIENT)
Dept: OUTPATIENT SERVICES | Facility: HOSPITAL | Age: 74
LOS: 1 days | End: 2024-04-25
Payer: MEDICARE

## 2024-04-25 ENCOUNTER — RESULT REVIEW (OUTPATIENT)
Age: 74
End: 2024-04-25

## 2024-04-25 DIAGNOSIS — Z12.31 ENCOUNTER FOR SCREENING MAMMOGRAM FOR MALIGNANT NEOPLASM OF BREAST: ICD-10-CM

## 2024-04-25 PROCEDURE — 77063 BREAST TOMOSYNTHESIS BI: CPT

## 2024-04-25 PROCEDURE — 77067 SCR MAMMO BI INCL CAD: CPT | Mod: 26

## 2024-04-25 PROCEDURE — 77063 BREAST TOMOSYNTHESIS BI: CPT | Mod: 26

## 2024-04-25 PROCEDURE — 77067 SCR MAMMO BI INCL CAD: CPT

## 2024-04-26 DIAGNOSIS — Z12.31 ENCOUNTER FOR SCREENING MAMMOGRAM FOR MALIGNANT NEOPLASM OF BREAST: ICD-10-CM

## 2024-10-08 ENCOUNTER — NON-APPOINTMENT (OUTPATIENT)
Age: 74
End: 2024-10-08

## 2025-06-12 ENCOUNTER — OUTPATIENT (OUTPATIENT)
Dept: OUTPATIENT SERVICES | Facility: HOSPITAL | Age: 75
LOS: 1 days | End: 2025-06-12
Payer: MEDICARE

## 2025-06-12 DIAGNOSIS — Z12.31 ENCOUNTER FOR SCREENING MAMMOGRAM FOR MALIGNANT NEOPLASM OF BREAST: ICD-10-CM

## 2025-06-12 DIAGNOSIS — Z00.00 ENCOUNTER FOR GENERAL ADULT MEDICAL EXAMINATION WITHOUT ABNORMAL FINDINGS: ICD-10-CM

## 2025-06-12 PROCEDURE — 77063 BREAST TOMOSYNTHESIS BI: CPT | Mod: 26

## 2025-06-12 PROCEDURE — 77063 BREAST TOMOSYNTHESIS BI: CPT

## 2025-06-12 PROCEDURE — 77067 SCR MAMMO BI INCL CAD: CPT

## 2025-06-12 PROCEDURE — 77067 SCR MAMMO BI INCL CAD: CPT | Mod: 26

## 2025-06-13 DIAGNOSIS — Z12.31 ENCOUNTER FOR SCREENING MAMMOGRAM FOR MALIGNANT NEOPLASM OF BREAST: ICD-10-CM
